# Patient Record
Sex: FEMALE | Race: ASIAN | NOT HISPANIC OR LATINO | URBAN - METROPOLITAN AREA
[De-identification: names, ages, dates, MRNs, and addresses within clinical notes are randomized per-mention and may not be internally consistent; named-entity substitution may affect disease eponyms.]

---

## 2018-03-06 ENCOUNTER — EMERGENCY (EMERGENCY)
Facility: HOSPITAL | Age: 33
LOS: 1 days | Discharge: HOME | End: 2018-03-06
Admitting: EMERGENCY MEDICINE
Payer: MEDICAID

## 2018-03-06 VITALS
HEART RATE: 95 BPM | SYSTOLIC BLOOD PRESSURE: 117 MMHG | RESPIRATION RATE: 16 BRPM | TEMPERATURE: 99 F | DIASTOLIC BLOOD PRESSURE: 81 MMHG | OXYGEN SATURATION: 99 %

## 2018-03-06 PROCEDURE — 12002 RPR S/N/AX/GEN/TRNK2.6-7.5CM: CPT

## 2018-03-06 PROCEDURE — 99283 EMERGENCY DEPT VISIT LOW MDM: CPT | Mod: 25

## 2018-03-06 RX ORDER — TETANUS TOXOID, REDUCED DIPHTHERIA TOXOID AND ACELLULAR PERTUSSIS VACCINE, ADSORBED 5; 2.5; 8; 8; 2.5 [IU]/.5ML; [IU]/.5ML; UG/.5ML; UG/.5ML; UG/.5ML
0.5 SUSPENSION INTRAMUSCULAR ONCE
Qty: 0 | Refills: 0 | Status: COMPLETED | OUTPATIENT
Start: 2018-03-06 | End: 2018-03-06

## 2018-03-06 RX ADMIN — TETANUS TOXOID, REDUCED DIPHTHERIA TOXOID AND ACELLULAR PERTUSSIS VACCINE, ADSORBED 0.5 MILLILITER(S): 5; 2.5; 8; 8; 2.5 SUSPENSION INTRAMUSCULAR at 21:43

## 2018-03-06 NOTE — ED PROVIDER NOTE - CARE PLAN
Principal Discharge DX:	Laceration of finger of left hand Principal Discharge DX:	Laceration of finger of left hand  Assessment and plan of treatment:	Have sutures removed in 7 days. Apply bacitracin twice per day. Keep area clean and dry (gentle washing with soap and water is ok). Rest, drink plenty of fluids.  Advance activity as tolerated.  Continue all previously prescribed medications as directed. You can use motrin 600mg every 6-8 hours for pain or fever, and/or Tylenol 650 mg every 4 hours for pain/fever. Follow up with your primary care physician in 48-72 hours- bring copies of your results.  Return to the emergency department for chest pain, shortness of breath, dizziness, or worsening, concerning, or persistent symptoms.

## 2018-03-06 NOTE — ED ADULT NURSE REASSESSMENT NOTE - NS ED NURSE REASSESS COMMENT FT1
Pt seen primarily by RENUKA Thorne, pt aox3, ambulatory, rcvd in surge area, here for a lac on L 2nd and 3rd fingers after trying to open a door, lac repair done, pt denies any other complaints, dc'ed in stable condition, steady gait, nad

## 2018-03-06 NOTE — ED PROVIDER NOTE - OBJECTIVE STATEMENT
33 yo F denies pmhx here for finger laceration x today. pt reports she was opening door and cut her L 2nd and 3rd fingers. bleeding controlled at home. denies other complaints. unknown last tetanus. denies fever chills vomiting numbness tingling. denies other injury.

## 2018-03-06 NOTE — ED PROVIDER NOTE - PHYSICAL EXAMINATION
L hand: 2nd and 3rd digit each with 1 cm linear superficial laceration, no active bleeding or discharge, FROM, no e/o ligament damage or injury. FROM of digits. NVI. sensate intact.

## 2018-03-06 NOTE — ED PROVIDER NOTE - PLAN OF CARE
Have sutures removed in 7 days. Apply bacitracin twice per day. Keep area clean and dry (gentle washing with soap and water is ok). Rest, drink plenty of fluids.  Advance activity as tolerated.  Continue all previously prescribed medications as directed. You can use motrin 600mg every 6-8 hours for pain or fever, and/or Tylenol 650 mg every 4 hours for pain/fever. Follow up with your primary care physician in 48-72 hours- bring copies of your results.  Return to the emergency department for chest pain, shortness of breath, dizziness, or worsening, concerning, or persistent symptoms.

## 2018-03-06 NOTE — ED ADULT TRIAGE NOTE - CHIEF COMPLAINT QUOTE
Pt complaining of L hand pointer and middle finger pain and laceration, cut her fingers on a door. Bleeding controlled at this time. pt denies any other medical complaints at this time.

## 2018-03-06 NOTE — ED PROVIDER NOTE - MEDICAL DECISION MAKING DETAILS
31 yo F denies pmhx here for L 2/3rd digit laceration from door. otherwise without complaints. requiring sutures. 33 yo F denies pmhx here for L 2/3rd digit laceration from door. otherwise without complaints. requiring sutures. adacel.

## 2018-03-06 NOTE — ED PROCEDURE NOTE - PROCEDURE ADDITIONAL DETAILS
2 separate 2 cm lacerations one on 2nd digit one on 3rd digit   no active bleeding  4 sutures placed in 2nd digit and 3 in third

## 2021-09-08 PROBLEM — Z00.00 ENCOUNTER FOR PREVENTIVE HEALTH EXAMINATION: Noted: 2021-09-08

## 2021-09-09 ENCOUNTER — APPOINTMENT (OUTPATIENT)
Dept: OBGYN | Facility: CLINIC | Age: 36
End: 2021-09-09

## 2021-09-17 ENCOUNTER — APPOINTMENT (OUTPATIENT)
Dept: OBGYN | Facility: CLINIC | Age: 36
End: 2021-09-17
Payer: MEDICAID

## 2021-09-17 VITALS — SYSTOLIC BLOOD PRESSURE: 111 MMHG | WEIGHT: 148 LBS | DIASTOLIC BLOOD PRESSURE: 76 MMHG

## 2021-09-17 DIAGNOSIS — Z34.90 ENCOUNTER FOR SUPERVISION OF NORMAL PREGNANCY, UNSPECIFIED, UNSPECIFIED TRIMESTER: ICD-10-CM

## 2021-09-17 PROCEDURE — 99204 OFFICE O/P NEW MOD 45 MIN: CPT

## 2021-09-17 PROCEDURE — 36415 COLL VENOUS BLD VENIPUNCTURE: CPT

## 2021-09-17 RX ORDER — LEVOTHYROXINE SODIUM 0.17 MG/1
TABLET ORAL
Refills: 0 | Status: ACTIVE | COMMUNITY

## 2021-09-18 ENCOUNTER — OUTPATIENT (OUTPATIENT)
Dept: INPATIENT UNIT | Facility: HOSPITAL | Age: 36
LOS: 1 days | Discharge: HOME | End: 2021-09-18

## 2021-09-18 VITALS
DIASTOLIC BLOOD PRESSURE: 72 MMHG | TEMPERATURE: 98 F | SYSTOLIC BLOOD PRESSURE: 110 MMHG | RESPIRATION RATE: 18 BRPM | HEART RATE: 102 BPM

## 2021-09-18 VITALS — HEART RATE: 97 BPM | SYSTOLIC BLOOD PRESSURE: 96 MMHG | DIASTOLIC BLOOD PRESSURE: 54 MMHG

## 2021-09-18 DIAGNOSIS — Z3A.00 WEEKS OF GESTATION OF PREGNANCY NOT SPECIFIED: ICD-10-CM

## 2021-09-18 DIAGNOSIS — O26.899 OTHER SPECIFIED PREGNANCY RELATED CONDITIONS, UNSPECIFIED TRIMESTER: ICD-10-CM

## 2021-09-18 LAB
APPEARANCE UR: CLEAR — SIGNIFICANT CHANGE UP
BACTERIA # UR AUTO: NEGATIVE — SIGNIFICANT CHANGE UP
BASOPHILS # BLD AUTO: 0.01 K/UL
BASOPHILS NFR BLD AUTO: 0.1 %
BILIRUB UR-MCNC: NEGATIVE — SIGNIFICANT CHANGE UP
COD CRY URNS QL: ABNORMAL
COLOR SPEC: YELLOW — SIGNIFICANT CHANGE UP
COMMENT - URINE: SIGNIFICANT CHANGE UP
DIFF PNL FLD: ABNORMAL
EOSINOPHIL # BLD AUTO: 0.06 K/UL
EOSINOPHIL NFR BLD AUTO: 0.7 %
EPI CELLS # UR: ABNORMAL
GLUCOSE 1H P 50 G GLC PO SERPL-MCNC: 119 MG/DL
GLUCOSE UR QL: NEGATIVE — SIGNIFICANT CHANGE UP
HCT VFR BLD CALC: 37.8 %
HGB BLD-MCNC: 12.1 G/DL
HYALINE CASTS # UR AUTO: 2 /LPF — SIGNIFICANT CHANGE UP (ref 0–7)
IMM GRANULOCYTES NFR BLD AUTO: 0.8 %
KETONES UR-MCNC: NEGATIVE — SIGNIFICANT CHANGE UP
LEUKOCYTE ESTERASE UR-ACNC: NEGATIVE — SIGNIFICANT CHANGE UP
LYMPHOCYTES # BLD AUTO: 1.46 K/UL
LYMPHOCYTES NFR BLD AUTO: 16.7 %
MAN DIFF?: NORMAL
MCHC RBC-ENTMCNC: 29.8 PG
MCHC RBC-ENTMCNC: 32 GM/DL
MCV RBC AUTO: 93.1 FL
MONOCYTES # BLD AUTO: 0.52 K/UL
MONOCYTES NFR BLD AUTO: 5.9 %
NEUTROPHILS # BLD AUTO: 6.63 K/UL
NEUTROPHILS NFR BLD AUTO: 75.8 %
NITRITE UR-MCNC: NEGATIVE — SIGNIFICANT CHANGE UP
PH UR: 6.5 — SIGNIFICANT CHANGE UP (ref 5–8)
PLATELET # BLD AUTO: 259 K/UL
PROT UR-MCNC: ABNORMAL
RBC # BLD: 4.06 M/UL
RBC # FLD: 15.3 %
RBC CASTS # UR COMP ASSIST: 1 /HPF — SIGNIFICANT CHANGE UP (ref 0–4)
SP GR SPEC: 1.02 — SIGNIFICANT CHANGE UP (ref 1–1.05)
T PALLIDUM AB SER QL IA: NEGATIVE
UROBILINOGEN FLD QL: SIGNIFICANT CHANGE UP
WBC # FLD AUTO: 8.75 K/UL
WBC UR QL: 1 /HPF — SIGNIFICANT CHANGE UP (ref 0–5)

## 2021-09-18 NOTE — OB PROVIDER TRIAGE NOTE - HISTORY OF PRESENT ILLNESS
35 yr old  @ 29-6 wks, presets with vaginal bleeding since 8 pm. States she went to void and saw and spot of blood on her panty liner, with more on the tissue after she wiped. Panty liner in triage had scan bright red blood. Denies VB/LOF/Ctx. Reports positive fetal movement.   PNI: Thyroid disorder, on synthroid 175 mcg  Obhx: , FT , 6 lbs           , FT , 6 lbs  Gynhx: denies  Surghx: denies  Medhx: thyroid disorder, on synthroid

## 2021-09-18 NOTE — OB PROVIDER TRIAGE NOTE - NSOBPROVIDERNOTE_OBGYN_ALL_OB_FT
35 yr ol d @ 29-6, 35 yr ol d @ 29-6,  No evidence of vaginal bleeding  Fetal status reassured  CAT 1 tracing  Discussed with Dr. Welch

## 2021-09-18 NOTE — OB PROVIDER TRIAGE NOTE - NSHPPHYSICALEXAM_GEN_ALL_CORE
Vital Signs Last 24 Hrs  T(C): 36.9 (18 Sep 2021 20:34), Max: 36.9 (18 Sep 2021 20:34)  T(F): 98.4 (18 Sep 2021 20:34), Max: 98.4 (18 Sep 2021 20:34)  HR: 102 (18 Sep 2021 20:38) (102 - 102)  BP: 110/72 (18 Sep 2021 20:38) (110/72 - 110/72)  BP(mean): --  RR: 18 (18 Sep 2021 20:34) (18 - 18)  SpO2: --    TOCO: ctx none  NST: , +10x10 accels, -decels, moderate variability  SSE: no pooling, no blood staining, cervix closed Vital Signs Last 24 Hrs  T(C): 36.9 (18 Sep 2021 20:34), Max: 36.9 (18 Sep 2021 20:34)  T(F): 98.4 (18 Sep 2021 20:34), Max: 98.4 (18 Sep 2021 20:34)  HR: 102 (18 Sep 2021 20:38) (102 - 102)  BP: 110/72 (18 Sep 2021 20:38) (110/72 - 110/72)  BP(mean): --  RR: 18 (18 Sep 2021 20:34) (18 - 18)  SpO2: --    TOCO: ctx none  NST: , +10x10 accels, -decels, moderate variability  SSE: no pooling, no blood staining, cervix closed  TVS: CL 4.7 cm, no dynamic changes, no funneling, no previa, posterior placenta  TAS: Vertex, posterior placenta, BPP 8/8, MVP 5 cm  AB positive

## 2021-09-20 PROBLEM — E03.9 HYPOTHYROIDISM, UNSPECIFIED: Chronic | Status: ACTIVE | Noted: 2021-09-18

## 2021-09-20 LAB — LEAD BLD-MCNC: <1 UG/DL

## 2021-09-21 LAB
M TB IFN-G BLD-IMP: NEGATIVE
QUANTIFERON TB PLUS MITOGEN MINUS NIL: 8.15 IU/ML
QUANTIFERON TB PLUS NIL: 0.01 IU/ML
QUANTIFERON TB PLUS TB1 MINUS NIL: 0.01 IU/ML
QUANTIFERON TB PLUS TB2 MINUS NIL: 0.03 IU/ML

## 2021-09-23 LAB
AR GENE MUT ANL BLD/T: NORMAL
FMR1 GENE MUT ANL BLD/T: NORMAL

## 2021-09-24 LAB — CFTR MUT TESTED BLD/T: NEGATIVE

## 2021-09-27 LAB
CLARI ADDITIONAL INFO: NORMAL
CLARI CHROMOSOME 13: NORMAL
CLARI CHROMOSOME 18: NORMAL
CLARI CHROMOSOME 21: NORMAL
CLARI SEX CHROMOSOMES: NORMAL
CLARITEST NIPT: NORMAL
MATERNAL WEIGHT (LBS):: 148
PLEASE INCLUDE GENDER RESULTS ON THIS REPORT:: NORMAL
TYPE OF PREGNANCY:: NORMAL

## 2021-10-01 ENCOUNTER — APPOINTMENT (OUTPATIENT)
Dept: OBGYN | Facility: CLINIC | Age: 36
End: 2021-10-01
Payer: MEDICAID

## 2021-10-01 DIAGNOSIS — O26.86 PRURITIC URTICARIAL PAPULES AND PLAQUES OF PREGNANCY (PUPPP): ICD-10-CM

## 2021-10-01 PROCEDURE — 0502F SUBSEQUENT PRENATAL CARE: CPT

## 2021-10-01 RX ORDER — MAGNESIUM HYDROXIDE 400 MG/5ML
27-0.8-25 SUSPENSION, ORAL (FINAL DOSE FORM) ORAL
Qty: 30 | Refills: 8 | Status: ACTIVE | COMMUNITY
Start: 2021-10-01 | End: 1900-01-01

## 2021-10-01 RX ORDER — CAMPHOR 0.45 %
25 GEL (GRAM) TOPICAL
Qty: 30 | Refills: 0 | Status: ACTIVE | COMMUNITY
Start: 2021-10-01 | End: 1900-01-01

## 2021-10-01 NOTE — PHYSICAL EXAM
[Appropriately responsive] : appropriately responsive [Alert] : alert [No Acute Distress] : no acute distress [No Lymphadenopathy] : no lymphadenopathy [Regular Rate Rhythm] : regular rate rhythm [No Murmurs] : no murmurs [Clear to Auscultation B/L] : clear to auscultation bilaterally [Soft] : soft [Non-tender] : non-tender [Non-distended] : non-distended [No HSM] : No HSM [No Lesions] : no lesions [No Mass] : no mass [Oriented x3] : oriented x3 [Examination Of The Breasts] : a normal appearance [No Masses] : no breast masses were palpable [Labia Majora] : normal [Labia Minora] : normal [Normal] : normal [Uterine Adnexae] : normal [FreeTextEntry7] : gravid

## 2021-10-01 NOTE — HISTORY OF PRESENT ILLNESS
[Patient reported PAP Smear was normal] : Patient reported PAP Smear was normal [FreeTextEntry1] : Patient is a 34 y/o  at 29w4d LMP 2/15/21, MARLEY 21 by first trimester sonogram. [place] to [place] care. PNC uncomplicated per patient but has not had glucose screen or genetic testing.\par \par OBHx:\par 2009: , male, 6#\par : , female, 6# [PapSmeardate] : 2020

## 2021-10-12 ENCOUNTER — NON-APPOINTMENT (OUTPATIENT)
Age: 36
End: 2021-10-12

## 2021-10-15 ENCOUNTER — APPOINTMENT (OUTPATIENT)
Dept: OBGYN | Facility: CLINIC | Age: 36
End: 2021-10-15
Payer: MEDICAID

## 2021-10-15 VITALS — SYSTOLIC BLOOD PRESSURE: 116 MMHG | DIASTOLIC BLOOD PRESSURE: 77 MMHG | WEIGHT: 153 LBS

## 2021-10-15 PROCEDURE — 0502F SUBSEQUENT PRENATAL CARE: CPT

## 2021-10-17 LAB — TSH SERPL-ACNC: 1.67 UIU/ML

## 2021-10-26 ENCOUNTER — NON-APPOINTMENT (OUTPATIENT)
Age: 36
End: 2021-10-26

## 2021-10-27 ENCOUNTER — APPOINTMENT (OUTPATIENT)
Dept: OBGYN | Facility: CLINIC | Age: 36
End: 2021-10-27

## 2021-11-02 ENCOUNTER — NON-APPOINTMENT (OUTPATIENT)
Age: 36
End: 2021-11-02

## 2021-11-04 ENCOUNTER — TRANSCRIPTION ENCOUNTER (OUTPATIENT)
Age: 36
End: 2021-11-04

## 2021-11-04 ENCOUNTER — APPOINTMENT (OUTPATIENT)
Dept: OBGYN | Facility: CLINIC | Age: 36
End: 2021-11-04
Payer: MEDICAID

## 2021-11-04 VITALS
DIASTOLIC BLOOD PRESSURE: 81 MMHG | WEIGHT: 156 LBS | SYSTOLIC BLOOD PRESSURE: 119 MMHG | BODY MASS INDEX: 29.45 KG/M2 | HEIGHT: 61 IN

## 2021-11-04 DIAGNOSIS — E03.9 HYPOTHYROIDISM, UNSPECIFIED: ICD-10-CM

## 2021-11-04 PROCEDURE — 0502F SUBSEQUENT PRENATAL CARE: CPT

## 2021-11-04 RX ORDER — LEVOTHYROXINE SODIUM 0.17 MG/1
175 TABLET ORAL DAILY
Qty: 90 | Refills: 1 | Status: ACTIVE | COMMUNITY
Start: 2021-11-04 | End: 1900-01-01

## 2021-11-05 LAB — HIV1+2 AB SPEC QL IA.RAPID: NONREACTIVE

## 2021-11-06 LAB
GP B STREP DNA SPEC QL NAA+PROBE: NORMAL
GP B STREP DNA SPEC QL NAA+PROBE: NOT DETECTED
SOURCE GBS: NORMAL

## 2021-11-09 ENCOUNTER — NON-APPOINTMENT (OUTPATIENT)
Age: 36
End: 2021-11-09

## 2021-11-11 ENCOUNTER — APPOINTMENT (OUTPATIENT)
Dept: OBGYN | Facility: CLINIC | Age: 36
End: 2021-11-11
Payer: MEDICAID

## 2021-11-11 VITALS — BODY MASS INDEX: 29.85 KG/M2 | SYSTOLIC BLOOD PRESSURE: 120 MMHG | WEIGHT: 158 LBS | DIASTOLIC BLOOD PRESSURE: 77 MMHG

## 2021-11-11 PROCEDURE — 0502F SUBSEQUENT PRENATAL CARE: CPT

## 2021-11-18 ENCOUNTER — APPOINTMENT (OUTPATIENT)
Dept: ANTEPARTUM | Facility: CLINIC | Age: 36
End: 2021-11-18
Payer: MEDICAID

## 2021-11-18 ENCOUNTER — APPOINTMENT (OUTPATIENT)
Dept: OBGYN | Facility: CLINIC | Age: 36
End: 2021-11-18
Payer: MEDICAID

## 2021-11-18 VITALS — SYSTOLIC BLOOD PRESSURE: 123 MMHG | BODY MASS INDEX: 30.23 KG/M2 | DIASTOLIC BLOOD PRESSURE: 82 MMHG | WEIGHT: 160 LBS

## 2021-11-18 PROCEDURE — 76819 FETAL BIOPHYS PROFIL W/O NST: CPT

## 2021-11-18 PROCEDURE — 76816 OB US FOLLOW-UP PER FETUS: CPT

## 2021-11-18 PROCEDURE — 0502F SUBSEQUENT PRENATAL CARE: CPT

## 2021-11-19 ENCOUNTER — NON-APPOINTMENT (OUTPATIENT)
Age: 36
End: 2021-11-19

## 2021-11-20 ENCOUNTER — APPOINTMENT (OUTPATIENT)
Dept: OBGYN | Facility: CLINIC | Age: 36
End: 2021-11-20
Payer: MEDICAID

## 2021-11-20 DIAGNOSIS — Z00.00 ENCOUNTER FOR GENERAL ADULT MEDICAL EXAMINATION W/OUT ABNORMAL FINDINGS: ICD-10-CM

## 2021-11-20 PROCEDURE — ZZZZZ: CPT

## 2021-11-22 ENCOUNTER — NON-APPOINTMENT (OUTPATIENT)
Age: 36
End: 2021-11-22

## 2021-11-22 LAB — SARS-COV-2 N GENE NPH QL NAA+PROBE: NOT DETECTED

## 2021-11-23 ENCOUNTER — INPATIENT (INPATIENT)
Facility: HOSPITAL | Age: 36
LOS: 2 days | Discharge: HOME | End: 2021-11-26
Attending: OBSTETRICS & GYNECOLOGY | Admitting: OBSTETRICS & GYNECOLOGY
Payer: MEDICAID

## 2021-11-23 ENCOUNTER — TRANSCRIPTION ENCOUNTER (OUTPATIENT)
Age: 36
End: 2021-11-23

## 2021-11-23 VITALS — TEMPERATURE: 99 F

## 2021-11-23 LAB
BASOPHILS # BLD AUTO: 0.02 K/UL — SIGNIFICANT CHANGE UP (ref 0–0.2)
BASOPHILS NFR BLD AUTO: 0.3 % — SIGNIFICANT CHANGE UP (ref 0–2)
COVID-19 SPIKE DOMAIN AB INTERP: POSITIVE
COVID-19 SPIKE DOMAIN ANTIBODY RESULT: >250 U/ML — HIGH
EOSINOPHIL # BLD AUTO: 0.09 K/UL — SIGNIFICANT CHANGE UP (ref 0–0.5)
EOSINOPHIL NFR BLD AUTO: 1.3 % — SIGNIFICANT CHANGE UP (ref 0–6)
HCT VFR BLD CALC: 37.9 % — SIGNIFICANT CHANGE UP (ref 34.5–45)
HGB BLD-MCNC: 13.1 G/DL — SIGNIFICANT CHANGE UP (ref 11.5–15.5)
IANC: 4.76 K/UL — SIGNIFICANT CHANGE UP (ref 1.5–8.5)
IMM GRANULOCYTES NFR BLD AUTO: 0.6 % — SIGNIFICANT CHANGE UP (ref 0–1.5)
LYMPHOCYTES # BLD AUTO: 1.68 K/UL — SIGNIFICANT CHANGE UP (ref 1–3.3)
LYMPHOCYTES # BLD AUTO: 23.5 % — SIGNIFICANT CHANGE UP (ref 13–44)
MCHC RBC-ENTMCNC: 31.3 PG — SIGNIFICANT CHANGE UP (ref 27–34)
MCHC RBC-ENTMCNC: 34.6 GM/DL — SIGNIFICANT CHANGE UP (ref 32–36)
MCV RBC AUTO: 90.5 FL — SIGNIFICANT CHANGE UP (ref 80–100)
MONOCYTES # BLD AUTO: 0.56 K/UL — SIGNIFICANT CHANGE UP (ref 0–0.9)
MONOCYTES NFR BLD AUTO: 7.8 % — SIGNIFICANT CHANGE UP (ref 2–14)
NEUTROPHILS # BLD AUTO: 4.76 K/UL — SIGNIFICANT CHANGE UP (ref 1.8–7.4)
NEUTROPHILS NFR BLD AUTO: 66.5 % — SIGNIFICANT CHANGE UP (ref 43–77)
NRBC # BLD: 0 /100 WBCS — SIGNIFICANT CHANGE UP
NRBC # FLD: 0 K/UL — SIGNIFICANT CHANGE UP
PLATELET # BLD AUTO: 190 K/UL — SIGNIFICANT CHANGE UP (ref 150–400)
RBC # BLD: 4.19 M/UL — SIGNIFICANT CHANGE UP (ref 3.8–5.2)
RBC # FLD: 15 % — HIGH (ref 10.3–14.5)
SARS-COV-2 IGG+IGM SERPL QL IA: >250 U/ML — HIGH
SARS-COV-2 IGG+IGM SERPL QL IA: POSITIVE
T PALLIDUM AB TITR SER: NEGATIVE — SIGNIFICANT CHANGE UP
WBC # BLD: 7.15 K/UL — SIGNIFICANT CHANGE UP (ref 3.8–10.5)
WBC # FLD AUTO: 7.15 K/UL — SIGNIFICANT CHANGE UP (ref 3.8–10.5)

## 2021-11-23 RX ORDER — OXYTOCIN 10 UNIT/ML
333.33 VIAL (ML) INJECTION
Qty: 20 | Refills: 0 | Status: DISCONTINUED | OUTPATIENT
Start: 2021-11-23 | End: 2021-11-25

## 2021-11-23 RX ORDER — SODIUM CHLORIDE 9 MG/ML
1000 INJECTION, SOLUTION INTRAVENOUS
Refills: 0 | Status: DISCONTINUED | OUTPATIENT
Start: 2021-11-23 | End: 2021-11-24

## 2021-11-23 RX ORDER — INFLUENZA VIRUS VACCINE 15; 15; 15; 15 UG/.5ML; UG/.5ML; UG/.5ML; UG/.5ML
0.5 SUSPENSION INTRAMUSCULAR ONCE
Refills: 0 | Status: DISCONTINUED | OUTPATIENT
Start: 2021-11-23 | End: 2021-11-26

## 2021-11-23 RX ORDER — LEVOTHYROXINE SODIUM 125 MCG
175 TABLET ORAL DAILY
Refills: 0 | Status: DISCONTINUED | OUTPATIENT
Start: 2021-11-23 | End: 2021-11-24

## 2021-11-23 RX ORDER — OXYTOCIN 10 UNIT/ML
VIAL (ML) INJECTION
Qty: 30 | Refills: 0 | Status: DISCONTINUED | OUTPATIENT
Start: 2021-11-23 | End: 2021-11-24

## 2021-11-23 RX ORDER — CITRIC ACID/SODIUM CITRATE 300-500 MG
15 SOLUTION, ORAL ORAL EVERY 6 HOURS
Refills: 0 | Status: DISCONTINUED | OUTPATIENT
Start: 2021-11-23 | End: 2021-11-24

## 2021-11-23 RX ADMIN — Medication 175 MICROGRAM(S): at 06:54

## 2021-11-23 RX ADMIN — Medication 2 MILLIUNIT(S)/MIN: at 20:14

## 2021-11-23 NOTE — OB PROVIDER H&P - PRO INTERPRETER NEED 2
From: John Tam  To: Pierre Diaz  Sent: 11/13/2020 5:15 AM CST  Subject: Medication Question    I am writing this message at 5 am. continuing with my BM problems. I had 4 BM from midnight to 4:30 am Didn't make it to the bathroom in time once. A little pain in stomach last 2 BM. The BM changed color from brown to light tan and watery. I am going to  the 3 suggested over the counter meds this AM. Being the weekend how long do I use the 3 meds until I know there is something else wrong. Thank you   English

## 2021-11-23 NOTE — CHART NOTE - NSCHARTNOTEFT_GEN_A_CORE
OB Attending Progress Note    Patient seen and evaluated at bedside.  Denies complaints.  Comfortable w/ epidural.      T(C): 37.0 (11-23-21 @ 19:09), Max: 37.1 (11-23-21 @ 02:29)  HR: 85 (11-23-21 @ 22:57) (68 - 110)  BP: 123/67 (11-23-21 @ 22:42) (105/66 - 137/91)  RR: 16 (11-23-21 @ 02:59) (16 - 16)  SpO2: 99% (11-23-21 @ 22:50) (97% - 100%)    SVE: prior exam unchanged, head high, unable to AROM - 8:30pm. Exam deferred at present    EFM: 145, mod nikki, +acels, intermittent late decels  [place]:  ctx q 2-3 mins    A/P 36y P2 admitted for risk reducing IOL      -Labor: s/p po cyto and cervical balloon, pitocin currently at 6mu/min. Intrauterine resuscitation with lateral positioning and O2  -Fetal Status: reassuring  -GBS: negative  -Analgesia: epidural in place    SHARITA Lee MD

## 2021-11-23 NOTE — OB PROVIDER LABOR PROGRESS NOTE - ASSESSMENT
A/P:   - Labor: rrIOL on PO Cytotec. CB placed.  - Fetus: Cat 1  - GBS: neg    Yessi Riojas, PGY-2  d/w Dr Haney   
A/P:   - Labor: rrIOL. Sp PO. Pt to get last dose PO then switch to Pitocin.  - Fetus: cat 1  - GBS: neg  - Pain: Epi pending     Yessi Riojas, PGY-2  d/w Dr Welch

## 2021-11-23 NOTE — OB RN PATIENT PROFILE - COMFORT/ACCEPTABLE PAIN LEVEL (0-10)
Patient's son, Delano, called for update. Called back and update was provided. All questions and concerns were addressed. Patient's son, Jasbir, also called. Son, Delano, stated he'd update so no need to call.   5

## 2021-11-23 NOTE — OB PROVIDER LABOR PROGRESS NOTE - NS_SUBJECTIVE/OBJECTIVE_OBGYN_ALL_OB_FT
R2 OB Labor Note    S: Patient seen and examined at bedside.     T(C): 36.5 (11-23-21 @ 14:56), Max: 37.1 (11-23-21 @ 02:29)  HR: 68 (11-23-21 @ 17:55) (68 - 94)  BP: 137/91 (11-23-21 @ 17:32) (127/83 - 137/91)  BP(mean): --  ABP: --  ABP(mean): --  RR: 16 (11-23-21 @ 02:59) (16 - 16)  SpO2: --  Wt(kg): --  CVP(mm Hg): --  CI: --  CAPILLARY BLOOD GLUCOSE       N/A      11-22 @ 07:01  -  11-23 @ 07:00  --------------------------------------------------------  IN:    Lactated Ringers: 500 mL  Total IN: 500 mL    OUT:  Total OUT: 0 mL    Total NET: 500 mL
R2 OB Labor Note    S: Patient seen and examined at bedside.     T(C): 36.5 (11-23-21 @ 14:56), Max: 37.1 (11-23-21 @ 02:29)  HR: 84 (11-23-21 @ 14:55) (81 - 94)  BP: 135/68 (11-23-21 @ 14:55) (127/83 - 135/68)  BP(mean): --  ABP: --  ABP(mean): --  RR: 16 (11-23-21 @ 02:59) (16 - 16)  SpO2: --  Wt(kg): --  CVP(mm Hg): --  CI: --  CAPILLARY BLOOD GLUCOSE       N/A      11-22 @ 07:01  -  11-23 @ 07:00  --------------------------------------------------------  IN:    Lactated Ringers: 500 mL  Total IN: 500 mL    OUT:  Total OUT: 0 mL    Total NET: 500 mL

## 2021-11-23 NOTE — OB PROVIDER H&P - NSHPPHYSICALEXAM_GEN_ALL_CORE
VS:  Vital Signs Last 24 Hrs  T(C): 37.1 (23 Nov 2021 02:59), Max: 37.1 (23 Nov 2021 02:29)  T(F): 98.8 (23 Nov 2021 02:59), Max: 98.8 (23 Nov 2021 02:59)  HR: 90 (23 Nov 2021 02:59) (90 - 90)  BP: 127/83 (23 Nov 2021 02:59) (127/83 - 127/83)  BP(mean): --  RR: 16 (23 Nov 2021 02:59) (16 - 16)  SpO2: --  GA: comfortable, NAD  Cards: RRR  Pulm: CTAB  EFH: Baseline 145, moderate variability, accelerations present, no decels  [place]: Quiet  VE: 0/0/-3  TAUS: vertex presentation

## 2021-11-23 NOTE — OB PROVIDER H&P - HISTORY OF PRESENT ILLNESS
R2 H&P    Pt is a 37 y/o  at 39w2d admitted for scheduled induction of labor  Patient denies contractions, vaginal bleeding, LOF, FM felt.   GBS negative  EFW 3300g    OBHx:  -   ->6lb3oz, patient reports either vacuum or forceps was used, unsure of which one  -   -> 6lb2oz  - eTOP x 1 D&C  GynHx: denies any fibroids, cysts, abnl pap smears, STIs  PMHx: hypothyroidism  PSHx: D&C  Med: levothyroxine 175 mcg, B12, PNV  All: denies  SH: denies alcohol, tobacco, or illicit substance use   Psych: denies anxiety or depression

## 2021-11-23 NOTE — OB PROVIDER H&P - ASSESSMENT
A&P:   Labor: admit for scheduled induction of labor  - IOL with po cytotec  -routine labs  -EFM/toco  -CLD, IV hydration  -Pepcid/reglan/bicitra  Fetal: cat 1 tracing, fetal status reassuring  GBS: neg      Discussed with Dr. Deanna Cabral PGY-2

## 2021-11-24 ENCOUNTER — TRANSCRIPTION ENCOUNTER (OUTPATIENT)
Age: 36
End: 2021-11-24

## 2021-11-24 LAB — SARS-COV-2 RNA SPEC QL NAA+PROBE: SIGNIFICANT CHANGE UP

## 2021-11-24 PROCEDURE — 59514 CESAREAN DELIVERY ONLY: CPT | Mod: AS,U9

## 2021-11-24 PROCEDURE — 59510 CESAREAN DELIVERY: CPT | Mod: U9

## 2021-11-24 RX ORDER — TETANUS TOXOID, REDUCED DIPHTHERIA TOXOID AND ACELLULAR PERTUSSIS VACCINE, ADSORBED 5; 2.5; 8; 8; 2.5 [IU]/.5ML; [IU]/.5ML; UG/.5ML; UG/.5ML; UG/.5ML
0.5 SUSPENSION INTRAMUSCULAR ONCE
Refills: 0 | Status: DISCONTINUED | OUTPATIENT
Start: 2021-11-24 | End: 2021-11-26

## 2021-11-24 RX ORDER — GUAIFENESIN/DEXTROMETHORPHAN 600MG-30MG
20 TABLET, EXTENDED RELEASE 12 HR ORAL EVERY 4 HOURS
Refills: 0 | Status: DISCONTINUED | OUTPATIENT
Start: 2021-11-24 | End: 2021-11-24

## 2021-11-24 RX ORDER — ACETAMINOPHEN 500 MG
975 TABLET ORAL
Refills: 0 | Status: DISCONTINUED | OUTPATIENT
Start: 2021-11-24 | End: 2021-11-26

## 2021-11-24 RX ORDER — DIPHENHYDRAMINE HCL 50 MG
25 CAPSULE ORAL EVERY 6 HOURS
Refills: 0 | Status: DISCONTINUED | OUTPATIENT
Start: 2021-11-24 | End: 2021-11-26

## 2021-11-24 RX ORDER — KETOROLAC TROMETHAMINE 30 MG/ML
30 SYRINGE (ML) INJECTION EVERY 6 HOURS
Refills: 0 | Status: DISCONTINUED | OUTPATIENT
Start: 2021-11-24 | End: 2021-11-25

## 2021-11-24 RX ORDER — SODIUM CHLORIDE 9 MG/ML
1000 INJECTION, SOLUTION INTRAVENOUS
Refills: 0 | Status: DISCONTINUED | OUTPATIENT
Start: 2021-11-24 | End: 2021-11-25

## 2021-11-24 RX ORDER — GUAIFENESIN/DEXTROMETHORPHAN 600MG-30MG
10 TABLET, EXTENDED RELEASE 12 HR ORAL EVERY 4 HOURS
Refills: 0 | Status: DISCONTINUED | OUTPATIENT
Start: 2021-11-24 | End: 2021-11-26

## 2021-11-24 RX ORDER — HEPARIN SODIUM 5000 [USP'U]/ML
5000 INJECTION INTRAVENOUS; SUBCUTANEOUS EVERY 12 HOURS
Refills: 0 | Status: DISCONTINUED | OUTPATIENT
Start: 2021-11-24 | End: 2021-11-26

## 2021-11-24 RX ORDER — IBUPROFEN 200 MG
600 TABLET ORAL EVERY 6 HOURS
Refills: 0 | Status: COMPLETED | OUTPATIENT
Start: 2021-11-24 | End: 2022-10-23

## 2021-11-24 RX ORDER — MAGNESIUM HYDROXIDE 400 MG/1
30 TABLET, CHEWABLE ORAL
Refills: 0 | Status: DISCONTINUED | OUTPATIENT
Start: 2021-11-24 | End: 2021-11-26

## 2021-11-24 RX ORDER — LANOLIN
1 OINTMENT (GRAM) TOPICAL EVERY 6 HOURS
Refills: 0 | Status: DISCONTINUED | OUTPATIENT
Start: 2021-11-24 | End: 2021-11-26

## 2021-11-24 RX ORDER — OXYCODONE HYDROCHLORIDE 5 MG/1
5 TABLET ORAL
Refills: 0 | Status: DISCONTINUED | OUTPATIENT
Start: 2021-11-24 | End: 2021-11-26

## 2021-11-24 RX ORDER — OXYCODONE HYDROCHLORIDE 5 MG/1
5 TABLET ORAL ONCE
Refills: 0 | Status: DISCONTINUED | OUTPATIENT
Start: 2021-11-24 | End: 2021-11-26

## 2021-11-24 RX ORDER — ACETAMINOPHEN 500 MG
1000 TABLET ORAL ONCE
Refills: 0 | Status: COMPLETED | OUTPATIENT
Start: 2021-11-24 | End: 2021-11-24

## 2021-11-24 RX ORDER — IBUPROFEN 200 MG
600 TABLET ORAL EVERY 6 HOURS
Refills: 0 | Status: DISCONTINUED | OUTPATIENT
Start: 2021-11-24 | End: 2021-11-26

## 2021-11-24 RX ORDER — OXYTOCIN 10 UNIT/ML
333.33 VIAL (ML) INJECTION
Qty: 20 | Refills: 0 | Status: DISCONTINUED | OUTPATIENT
Start: 2021-11-24 | End: 2021-11-25

## 2021-11-24 RX ORDER — OXYTOCIN 10 UNIT/ML
20 VIAL (ML) INJECTION
Qty: 30 | Refills: 0 | Status: DISCONTINUED | OUTPATIENT
Start: 2021-11-24 | End: 2021-11-24

## 2021-11-24 RX ORDER — SIMETHICONE 80 MG/1
80 TABLET, CHEWABLE ORAL EVERY 4 HOURS
Refills: 0 | Status: DISCONTINUED | OUTPATIENT
Start: 2021-11-24 | End: 2021-11-26

## 2021-11-24 RX ADMIN — Medication 600 MILLIGRAM(S): at 23:35

## 2021-11-24 RX ADMIN — Medication 0.25 MILLIGRAM(S): at 06:47

## 2021-11-24 RX ADMIN — Medication 0.25 MILLIGRAM(S): at 02:11

## 2021-11-24 RX ADMIN — Medication 30 MILLIGRAM(S): at 17:00

## 2021-11-24 RX ADMIN — Medication 30 MILLIGRAM(S): at 10:29

## 2021-11-24 RX ADMIN — Medication 400 MILLIGRAM(S): at 12:15

## 2021-11-24 RX ADMIN — Medication 1000 MILLIUNIT(S)/MIN: at 09:07

## 2021-11-24 RX ADMIN — Medication 30 MILLIGRAM(S): at 17:33

## 2021-11-24 RX ADMIN — HEPARIN SODIUM 5000 UNIT(S): 5000 INJECTION INTRAVENOUS; SUBCUTANEOUS at 17:00

## 2021-11-24 RX ADMIN — SODIUM CHLORIDE 75 MILLILITER(S): 9 INJECTION, SOLUTION INTRAVENOUS at 09:10

## 2021-11-24 RX ADMIN — Medication 30 MILLIGRAM(S): at 11:00

## 2021-11-24 NOTE — BRIEF OPERATIVE NOTE - NSICDXBRIEFPOSTOP_GEN_ALL_CORE_FT
POST-OP DIAGNOSIS:  Delivery by  section using transverse incision of lower segment of uterus 2021 09:31:10  Rachel Silva

## 2021-11-24 NOTE — OB PROVIDER DELIVERY SUMMARY - NSSELHIDDEN_OBGYN_ALL_OB_FT
[NS_DeliveryAttending1_OBGYN_ALL_OB_FT:[id number]==],[NS_DeliveryRN_OBGYN_ALL_OB_FT:[id number]==],[NS_DeliveryAssist1_OBGYN_ALL_OB_FT:[id number]==]

## 2021-11-24 NOTE — CHART NOTE - NSCHARTNOTEFT_GEN_A_CORE
OB Attending Progress Note    Patient seen and evaluated at bedside for prolonged decel.  Denies complaints.  Comfortable w/ epidural.      T(C): 37.2 (11-24-21 @ 01:23), Max: 37.2 (11-24-21 @ 01:23)  HR: 133 (11-24-21 @ 02:30) (68 - 133)  BP: 117/- (11-24-21 @ 02:28) (105/66 - 137/91)  RR: 14 (11-23-21 @ 23:02) (14 - 16)  SpO2: 99% (11-24-21 @ 02:00) (97% - 100%)    SVE: unchanged, IUPC placed    EFM: 150, mod nikki, no accels, prolonged decel x4 mins  [place]:  ctx irregular q 2-4 mins    A/P 36y P2  admitted for risk reducing IOL      -Labor: terbutaline given x1. Contractions inadequate on IUPC. Will continue intrauterine resuscitation. Will start 1x1 pitocin once tracing improves  -Fetal Status: overall reassuring  -GBS: negative  -Analgesia: epidural in place    SHARITA Lee MD

## 2021-11-24 NOTE — OB RN INTRAOPERATIVE NOTE - NSSELHIDDEN_OBGYN_ALL_OB_FT
[NS_DeliveryAttending1_OBGYN_ALL_OB_FT:[id number]==],[NS_DeliveryRN_OBGYN_ALL_OB_FT:[id number]==]

## 2021-11-24 NOTE — OB RN DELIVERY SUMMARY - NS_CULTURES_OBGYN_ALL_OB
[place] ED to IP Report (EDIP)    Mental Status     Alert and oriented    Safety     None    Mobility    Independent    Protocols  None    ISAR          Isolation  None    Additional Information:   No

## 2021-11-24 NOTE — DISCHARGE NOTE OB - CARE PROVIDER_API CALL
Emily Love; MPH)  Bhavna LI  [address], [address]  [place] [address]  Phone: [phone]  Fax: [phone]  Follow Up Time:

## 2021-11-24 NOTE — CHART NOTE - NSCHARTNOTEFT_GEN_A_CORE
OB Attending    At patient's bedside reviewing tracing and course of induction  Exam remains unchanged  Amnioinfusion continues  Variable decels with 50% of contractions    Plan of care including primary C/S discussed with patient. Patient declining at this time, would like to continue IOL  Explained that we are unable to start pitocin given FHT  Explained to patient that C/S indicated in event of terminal bradycardia and signs of persistent fetal distress    At present patient currently desirae on her own, +accel with scalp stim    Plan  -Will continue to closely monitor tracing  -Continue intrauterine resuscitation    N Rebekah-MD Julio Cesar OB Attending    At patient's bedside reviewing tracing and course of induction  Exam remains unchanged  Amnioinfusion continues  Recurrent variable decels of contractions    Plan of care including primary C/S discussed with patient. Patient declining at this time  Explained that we are unable to start pitocin given FHT  Explained to patient that C/S indicated in event of terminal bradycardia and signs of persistent fetal distress    At present patient currently desirae on her own, +accel with scalp stim    Plan  -terbutaline given   -Continue intrauterine resuscitation  -Will continue to closely monitor tracing    N Sample-MD Julio Cesar

## 2021-11-24 NOTE — OB RN DELIVERY SUMMARY - NSSELHIDDEN_OBGYN_ALL_OB_FT
[NS_DeliveryAttending1_OBGYN_ALL_OB_FT:[id number]==],[NS_DeliveryRN_OBGYN_ALL_OB_FT:[id number]==] [NS_DeliveryAttending1_OBGYN_ALL_OB_FT:[id number]==],[NS_DeliveryRN_OBGYN_ALL_OB_FT:[id number]==],[NS_DeliveryAssist1_OBGYN_ALL_OB_FT:[id number]==]

## 2021-11-24 NOTE — DISCHARGE NOTE OB - MEDICATION SUMMARY - MEDICATIONS TO TAKE
I will START or STAY ON the medications listed below when I get home from the hospital:    ibuprofen 600 mg oral tablet  -- 1 tab(s) by mouth every 6 hours  -- Indication: For  delivery delivered    oxyCODONE 5 mg oral tablet  -- 1 tab(s) by mouth every 4 to 6 hours, As Needed -Moderate to Severe Pain (4-10) MDD:6 tabs  -- Indication: For  delivery delivered

## 2021-11-24 NOTE — BRIEF OPERATIVE NOTE - OPERATION/FINDINGS
Viable male infant, apgar 8/9, weight [id number]  delivered @07:49  cephalic presentation; clear copious fluid noted  nuchal x 1, cord around wrist X 1  hysterotomy closed in single layer; left extension was repaired  Surgicel powder placed over hysterotomy, rectus layer reapproximated; fascia layer reapproximated   otherwise grossly normal uterus, tubes & ovaries    QBL: 1058 cc  UOP: 170 cc  IVF: 1500 cc  Dictation Number: [id number]

## 2021-11-24 NOTE — OB RN DELIVERY SUMMARY - NS_LABORCHARACTER_OBGYN_ALL_OB
Induction of labor-AROM/Induction of labor-Medicinal/Augmentation of labor/External electronic FM/Fetal intolerance

## 2021-11-24 NOTE — DISCHARGE NOTE OB - PATIENT PORTAL LINK FT
You can access the FollowMyHealth Patient Portal offered by [facility] by registering at the following website: [email or web address]. By joining [employer]’s FollowMyHealth portal, you will also be able to view your health information using other applications (apps) compatible with our system.

## 2021-11-24 NOTE — OB RN DELIVERY SUMMARY - NS_SEPSISRSKCALC_OBGYN_ALL_OB_FT
EOS calculated successfully. EOS Risk Factor: 0.5/1000 live births ([facility] national incidence); GA=39w3d; Temp=98.96; ROM=6.567; GBS='Negative'; Antibiotics='No antibiotics or any antibiotics < 2 hrs prior to birth'

## 2021-11-24 NOTE — OB PROVIDER DELIVERY SUMMARY - NSPROVIDERDELIVERYNOTE_OBGYN_ALL_OB_FT
Viable male infant, apgar 8/9, weight [id number]  delivered @07:49  cephalic presentation; clear copious fluid noted  nuchal x 1, cord around wrist X 1  hysterotomy closed in single layer; left extension was repaired  Surgicel powder placed over hysterotomy, rectus layer reapproximated; fascia layer reapproximated   otherwise grossly normal uterus, tubes & ovaries    QBL: 1058 cc  UOP: 170 cc  IVF: 1500 cc  Dictation Number: [id number] Viable male infant, apgar 8/9, weight [id number]  delivered @07:49  cephalic presentation; meconium stained fluid noted  nuchal x 1, cord around wrist X 1  hysterotomy closed in single layer; left extension was repaired  Surgicel powder placed over hysterotomy, rectus layer reapproximated; fascia layer reapproximated   otherwise grossly normal uterus, tubes & ovaries    QBL: 1058 cc  UOP: 170 cc  IVF: 1500 cc  Dictation Number: [id number]

## 2021-11-24 NOTE — DISCHARGE NOTE OB - CARE PLAN
Principal Discharge DX:	 delivery delivered  Assessment and plan of treatment:	No heavy lifting  Nothing per vagina   1

## 2021-11-24 NOTE — OB NEONATOLOGY/PEDIATRICIAN DELIVERY SUMMARY - NSPEDSNEONOTESA_OBGYN_ALL_OB_FT
Pediatrics called for category II FH tracing and primary c/s. This is a 39+3 wk male born via c/s to a 35 y/o  mother. Maternal pmh significant for hypothyroidism (on synthroid) and no significant prenatal history. Maternal labs include Blood Type AB+, HIV -  , RPR NR, Rubella I, Hep B -, GBS - from . Mother COVID negative. AROM with blood in fluids approximately 6/5 hours PTD. Baby emerged vigorous, crying, was w/d/s/s with APGARS of 8/9. At approximately 6 minutes of life, baby was showing signs of increased work of breathing. CPAP initiated with max setting of 5/60%; able to be weaned to room air and off CPAP by 15 minutes of life.  Mom plans to initiate breastfeeding, consents to Hep B vaccine, declines circ.  No maternal fevers

## 2021-11-24 NOTE — CHART NOTE - NSCHARTNOTEFT_GEN_A_CORE
OB Attending    Plan of care discussed with patient and   R/B/A discussed  Decision made to proceed with primary C/S    N Rebekah-MD Julio Cesar

## 2021-11-25 LAB
BASOPHILS # BLD AUTO: 0.02 K/UL — SIGNIFICANT CHANGE UP (ref 0–0.2)
BASOPHILS NFR BLD AUTO: 0.1 % — SIGNIFICANT CHANGE UP (ref 0–2)
EOSINOPHIL # BLD AUTO: 0.05 K/UL — SIGNIFICANT CHANGE UP (ref 0–0.5)
EOSINOPHIL NFR BLD AUTO: 0.3 % — SIGNIFICANT CHANGE UP (ref 0–6)
HCT VFR BLD CALC: 29 % — LOW (ref 34.5–45)
HGB BLD-MCNC: 9.6 G/DL — LOW (ref 11.5–15.5)
IANC: 11.92 K/UL — HIGH (ref 1.5–8.5)
IMM GRANULOCYTES NFR BLD AUTO: 1 % — SIGNIFICANT CHANGE UP (ref 0–1.5)
LYMPHOCYTES # BLD AUTO: 1.87 K/UL — SIGNIFICANT CHANGE UP (ref 1–3.3)
LYMPHOCYTES # BLD AUTO: 12.7 % — LOW (ref 13–44)
MCHC RBC-ENTMCNC: 31.6 PG — SIGNIFICANT CHANGE UP (ref 27–34)
MCHC RBC-ENTMCNC: 33.1 GM/DL — SIGNIFICANT CHANGE UP (ref 32–36)
MCV RBC AUTO: 95.4 FL — SIGNIFICANT CHANGE UP (ref 80–100)
MONOCYTES # BLD AUTO: 0.73 K/UL — SIGNIFICANT CHANGE UP (ref 0–0.9)
MONOCYTES NFR BLD AUTO: 5 % — SIGNIFICANT CHANGE UP (ref 2–14)
NEUTROPHILS # BLD AUTO: 11.92 K/UL — HIGH (ref 1.8–7.4)
NEUTROPHILS NFR BLD AUTO: 80.9 % — HIGH (ref 43–77)
NRBC # BLD: 0 /100 WBCS — SIGNIFICANT CHANGE UP
NRBC # FLD: 0 K/UL — SIGNIFICANT CHANGE UP
PLATELET # BLD AUTO: 164 K/UL — SIGNIFICANT CHANGE UP (ref 150–400)
RBC # BLD: 3.04 M/UL — LOW (ref 3.8–5.2)
RBC # FLD: 15.4 % — HIGH (ref 10.3–14.5)
WBC # BLD: 14.73 K/UL — HIGH (ref 3.8–10.5)
WBC # FLD AUTO: 14.73 K/UL — HIGH (ref 3.8–10.5)

## 2021-11-25 RX ORDER — LEVOTHYROXINE SODIUM 125 MCG
175 TABLET ORAL DAILY
Refills: 0 | Status: DISCONTINUED | OUTPATIENT
Start: 2021-11-25 | End: 2021-11-26

## 2021-11-25 RX ADMIN — Medication 600 MILLIGRAM(S): at 05:55

## 2021-11-25 RX ADMIN — Medication 975 MILLIGRAM(S): at 12:59

## 2021-11-25 RX ADMIN — Medication 975 MILLIGRAM(S): at 13:45

## 2021-11-25 RX ADMIN — MAGNESIUM HYDROXIDE 30 MILLILITER(S): 400 TABLET, CHEWABLE ORAL at 05:54

## 2021-11-25 RX ADMIN — HEPARIN SODIUM 5000 UNIT(S): 5000 INJECTION INTRAVENOUS; SUBCUTANEOUS at 05:55

## 2021-11-25 RX ADMIN — Medication 175 MICROGRAM(S): at 06:37

## 2021-11-25 RX ADMIN — Medication 975 MILLIGRAM(S): at 03:00

## 2021-11-25 RX ADMIN — Medication 600 MILLIGRAM(S): at 20:59

## 2021-11-25 RX ADMIN — Medication 975 MILLIGRAM(S): at 02:22

## 2021-11-25 RX ADMIN — Medication 10 MILLILITER(S): at 12:59

## 2021-11-25 RX ADMIN — SIMETHICONE 80 MILLIGRAM(S): 80 TABLET, CHEWABLE ORAL at 05:55

## 2021-11-25 RX ADMIN — Medication 600 MILLIGRAM(S): at 20:20

## 2021-11-25 RX ADMIN — Medication 600 MILLIGRAM(S): at 00:00

## 2021-11-25 RX ADMIN — HEPARIN SODIUM 5000 UNIT(S): 5000 INJECTION INTRAVENOUS; SUBCUTANEOUS at 18:04

## 2021-11-25 NOTE — PROGRESS NOTE ADULT - PROBLEM SELECTOR PLAN 1
Reason for Disposition  • [1] COVID-19 infection suspected by caller or triager AND [2] mild symptoms (cough, fever, or others) AND [3] no complications or SOB    Additional Information  • Negative: [1] Fever AND [2] > 105 F (40.6 C) by any route OR axillary > 104 F (40 C)     unk    Protocols used: CORONAVIRUS (COVID-19) DIAGNOSED OR [id number]-C-TP     Onset: a week  Location/description: Patient has had a runny nose and congestion for a week. Cough is productive and sore throat present. No difficulty breathing.  Associated Symptoms: no  What improves/worsens symptoms: no  Symptom specific medications: Tylenol-none today  Intake and Output: poor appetite, drinking well. Output normal  Activity level: normal  Temperature (route and time): unk  Weight:   Wt Readings from Last 1 Encounters:   04/28/21 12.6 kg (89 %, Z= 1.25)*     * Growth percentiles are based on WHO (Boys, 0-2 years) data.        Recent visits (last 3-4 weeks) for same reason or recent surgery:  no  Did the patient have a positive coronavirus screening?: Yes, if it is necessary to send patient to a healthcare facility (L&D, ER, Urgent Care, etc.), call and notify facility. DO NOT schedule any face-to-face appointments (clinic visits, lab, etc.)        PLAN:  See/Call Provider within 24 hours    Caller agrees to follow recommendations.   Regarding: wi coughing, congestion, runny nose 2 of 3  ----- Message from Lydia Rolon sent at 8/14/2021  3:55 PM CDT -----  Patient Name: Michel Rivers    Full Name of Provider seen for current symptoms:none    Pregnant (If Yes, how long?):na    Symptoms: coughing, congestion, running nose, fever    Do you or any of your household members have the following symptoms:  Fever >100.0#F or >38.0#C: Yes    New or worsening cough, shortness of breath, sore throat, congestion, or runny nose: Yes    New onset of nausea, vomiting or diarrhea: No    New onset of loss of taste or smell, chills, repeated shaking with chills, muscle pain, or headache: No    Have you, a household member, or another person you have been in contact with tested positive for COVID-19 in the last 14 days?: No    Call Back #: [phone]     Call Center Account # for provider seen for current symptoms: [id number]    Which State are you currently located in? (enter State name in Summary field): [place]    Please update the Demographics section with the patients permanent resident address     Emergent COVID-19 Symptoms requiring Nurse Triage:  Trouble breathing, Persistent pain or pressure in the chest, New confusion, Inability to wake or stay awake, Bluish lips or face       Was evaluated in ER.    Of note, I haven't seen him since 9 months of age.    Thanks  MB   #PP care  - Continue regular diet.  - Increase ambulation.  - Continue motrin, tylenol, oxycodone PRN for pain control.    - AM CBC appropriate for QBL of 1058.   - Fe and Vit C ordered    Paris Penn, PGY1 16

## 2021-11-25 NOTE — PROGRESS NOTE ADULT - SUBJECTIVE AND OBJECTIVE BOX
OB Progress Note:  Delivery, POD#1    S: 35yo POD#1 s/p LTCS . Her pain is well controlled. She is tolerating a regular diet and passing flatus. Denies N/V. Denies CP/SOB/lightheadedness/dizziness. She is ambulating without difficulty. Voiding spontaneously.     O:   Vital Signs Last 24 Hrs  T(C): 36.6 (2021 06:54), Max: 37.2 (2021 08:58)  T(F): 97.9 (2021 06:54), Max: 99 (2021 08:58)  HR: 98 (2021 06:54) (70 - 137)  BP: 115/70 (2021 06:54) (112/68 - 143/75)  BP(mean): 77 (2021 12:00) (64 - 114)  RR: 18 (2021 06:54) (13 - 25)  SpO2: 100% (2021 06:54) (89% - 100%)    Labs:  Blood type: AB Positive  Rubella IgG: RPR: Negative                          9.6<L>   14.73<H> >-----------< 164    (  @ 06:58 )             29.0<L>                        13.1   7.15 >-----------< 190    (  @ 03:10 )             37.9                  PE:  General: NAD  Abdomen: Mildly distended, appropriately tender, incision c/d/i. Uterine fundus firm and palpated at midline  Extremities: No erythema, no pitting edema, no calf tenderness bilaterally       OB Progress Note:  Delivery, POD#1    S: 37yo POD#1 s/p pLTCS . Her pain is well controlled. She is tolerating a regular diet and passing flatus. Denies N/V. Denies CP/SOB/lightheadedness/dizziness. She is ambulating without difficulty. Voiding spontaneously.     O:   Vital Signs Last 24 Hrs  T(C): 36.6 (2021 06:54), Max: 37.2 (2021 08:58)  T(F): 97.9 (2021 06:54), Max: 99 (2021 08:58)  HR: 98 (2021 06:54) (70 - 137)  BP: 115/70 (2021 06:54) (112/68 - 143/75)  BP(mean): 77 (2021 12:00) (64 - 114)  RR: 18 (2021 06:54) (13 - 25)  SpO2: 100% (2021 06:54) (89% - 100%)    Labs:  Blood type: AB Positive  Rubella IgG: RPR: Negative                          9.6<L>   14.73<H> >-----------< 164    (  @ 06:58 )             29.0<L>                        13.1   7.15 >-----------< 190    (  @ 03:10 )             37.9                  PE:  General: NAD  Abdomen: Mildly distended, appropriately tender, incision c/d/i. Uterine fundus firm and palpated at midline  Extremities: No erythema, no pitting edema, no calf tenderness bilaterally       OB Progress Note:  Delivery, POD#1    S: 37yo POD#1 s/p pLTCS . Her pain is well controlled. She is tolerating a regular diet and passing flatus. Denies N/V. Denies CP/SOB/lightheadedness/dizziness. She is ambulating without difficulty. Voiding spontaneously.     O:   Vital Signs Last 24 Hrs  T(C): 36.6 (2021 06:54), Max: 37.2 (2021 08:58)  T(F): 97.9 (2021 06:54), Max: 99 (2021 08:58)  HR: 98 (2021 06:54) (70 - 137)  BP: 115/70 (2021 06:54) (112/68 - 143/75)  BP(mean): 77 (2021 12:00) (64 - 114)  RR: 18 (2021 06:54) (13 - 25)  SpO2: 100% (2021 06:54) (89% - 100%)    Labs:  Blood type: AB Positive  Rubella IgG: RPR: Negative                          9.6<L>   14.73<H> >-----------< 164    (  @ 06:58 )             29.0<L>                        13.1   7.15 >-----------< 190    (  @ 03:10 )             37.9          PE:  General: NAD  Abdomen: Mildly distended, appropriately tender, incision c/d/i. Uterine fundus firm and palpated at midline  Extremities: No erythema, no pitting edema, no calf tenderness bilaterally

## 2021-11-25 NOTE — PROGRESS NOTE ADULT - ASSESSMENT
A/P: 37yo POD#1 s/p pLTCS QBL 1058.  Patient is stable and doing well post-operatively.      #PP care  - Continue regular diet.  - Increase ambulation.  - Continue motrin, tylenol, oxycodone PRN for pain control.    - AM CBC appropriate for QBL of 1058.   - Fe and Vit C ordered    Paris Penn, PGY1  A/P: 35yo POD#1 s/p pLTCS QBL 1058.  Patient is stable and doing well post-operatively.

## 2021-11-25 NOTE — PROGRESS NOTE ADULT - SUBJECTIVE AND OBJECTIVE BOX
Postop Day  1  s/p   C- Section    THERAPY:  [ X] Spinal morphine         Sedation Score:	  [ X] Alert	    [  ] Drowsy        [  ] Arousable	[  ] Asleep	[  ] Unresponsive    Side Effects:	  [ X] None	     [  ] Nausea        [  ] Pruritus        [  ] Weakness   [  ] Numbness      No headache. Able to ambulate and void. Tolerates diet.    ASSESSMENT/ PLAN   Change to po prn pain meds 24 hours post Spinal Morphine.  [ x ]Documentation and Verification of current medications

## 2021-11-26 VITALS
SYSTOLIC BLOOD PRESSURE: 131 MMHG | DIASTOLIC BLOOD PRESSURE: 70 MMHG | HEART RATE: 100 BPM | OXYGEN SATURATION: 99 % | TEMPERATURE: 98 F | RESPIRATION RATE: 18 BRPM

## 2021-11-26 RX ORDER — OXYCODONE HYDROCHLORIDE 5 MG/1
1 TABLET ORAL
Qty: 10 | Refills: 0
Start: 2021-11-26

## 2021-11-26 RX ORDER — IBUPROFEN 200 MG
1 TABLET ORAL
Qty: 0 | Refills: 0 | DISCHARGE
Start: 2021-11-26

## 2021-11-26 RX ORDER — LEVOTHYROXINE SODIUM 125 MCG
1 TABLET ORAL
Qty: 0 | Refills: 0 | DISCHARGE

## 2021-11-26 RX ORDER — PREGABALIN 225 MG/1
0 CAPSULE ORAL
Qty: 0 | Refills: 0 | DISCHARGE

## 2021-11-26 RX ADMIN — Medication 600 MILLIGRAM(S): at 03:50

## 2021-11-26 RX ADMIN — HEPARIN SODIUM 5000 UNIT(S): 5000 INJECTION INTRAVENOUS; SUBCUTANEOUS at 06:19

## 2021-11-26 RX ADMIN — Medication 600 MILLIGRAM(S): at 09:15

## 2021-11-26 RX ADMIN — Medication 975 MILLIGRAM(S): at 06:49

## 2021-11-26 RX ADMIN — MAGNESIUM HYDROXIDE 30 MILLILITER(S): 400 TABLET, CHEWABLE ORAL at 06:40

## 2021-11-26 RX ADMIN — Medication 600 MILLIGRAM(S): at 03:17

## 2021-11-26 RX ADMIN — Medication 175 MICROGRAM(S): at 06:23

## 2021-11-26 RX ADMIN — Medication 975 MILLIGRAM(S): at 13:18

## 2021-11-26 RX ADMIN — Medication 975 MILLIGRAM(S): at 06:19

## 2021-11-26 RX ADMIN — Medication 600 MILLIGRAM(S): at 13:52

## 2021-11-26 RX ADMIN — Medication 10 MILLILITER(S): at 09:37

## 2021-11-26 RX ADMIN — Medication 975 MILLIGRAM(S): at 12:48

## 2021-11-26 NOTE — PROGRESS NOTE ADULT - ATTENDING COMMENTS
Pt seen and examined and agree with above  Pt requesting d/c home today
OB Attending    Patient seen and examined at bedside. Agree with above resident note  Abdominal exam benign  Incision c/d/i with steri strips  -continue routine inpatient care    N Sample-MD Julio Cesar

## 2021-11-26 NOTE — PROGRESS NOTE ADULT - SUBJECTIVE AND OBJECTIVE BOX
OB Progress Note: pTLCS, POD#2    S: 37yo now  POD#2 s/p pLTCS for abnormal fetal status  QBL 1058. Pt seen and examine at bedside. No acute events overnight. Pain is well controlled. She is tolerating a regular diet and passing flatus. She is voiding spontaneously, and ambulating without difficulty. Denies CP/SOB. Denies lightheadedness/dizziness. Denies N/V.    O:  Vitals:  Vital Signs Last 24 Hrs  T(C): 36.8 (2021 05:19), Max: 37.1 (2021 13:59)  T(F): 98.2 (2021 05:19), Max: 98.8 (2021 21:44)  HR: 79 (2021 05:19) (79 - 98)  BP: 126/82 (2021 05:19) (115/70 - 130/80)  BP(mean): --  RR: 18 (2021 05:19) (18 - 18)  SpO2: 100% (2021 05:19) (99% - 100%)    MEDICATIONS  (STANDING):  acetaminophen     Tablet .. 975 milliGRAM(s) Oral <User Schedule>  diphtheria/tetanus/pertussis (acellular) Vaccine (ADAcel) 0.5 milliLiter(s) IntraMuscular once  heparin   Injectable 5000 Unit(s) SubCutaneous every 12 hours  ibuprofen  Tablet. 600 milliGRAM(s) Oral every 6 hours  influenza   Vaccine 0.5 milliLiter(s) IntraMuscular once  levothyroxine 175 MICROGram(s) Oral daily      MEDICATIONS  (PRN):  diphenhydrAMINE 25 milliGRAM(s) Oral every 6 hours PRN Pruritus  guaifenesin/dextromethorphan Oral Liquid 10 milliLiter(s) Oral every 4 hours PRN Cough  lanolin Ointment 1 Application(s) Topical every 6 hours PRN Sore Nipples  magnesium hydroxide Suspension 30 milliLiter(s) Oral two times a day PRN Constipation  oxyCODONE    IR 5 milliGRAM(s) Oral every 3 hours PRN Moderate to Severe Pain (4-10)  oxyCODONE    IR 5 milliGRAM(s) Oral once PRN Moderate to Severe Pain (4-10)  simethicone 80 milliGRAM(s) Chew every 4 hours PRN Gas      Labs:  Blood type: AB Positive  Rubella IgG: RPR: Negative                          9.6<L>   14.73<H> >-----------< 164    ( 11- @ 06:58 )             29.0<L>              PE:  General: NAD  Lungs: CTA b/l good airway entry  CVS: RRR S1S2  Abdomen: Soft, appropriately tender, incision c/d/i.  Extremities: No erythema, no pitting edema, pedal pulse 2+ bilateral    A/P: 37yo now  POD#2 s/p pLTCS.  Patient is stable and doing well post-operatively.    - Continue Routine Postop/ PP care  - Continue regular diet.  - Increase ambulation.  - Continue Motrin, Tylenol, oxycodone PRN for pain control.  - Reviewed PEC precaution in detail pt verbalized understanding  - Discharge planning today  - F/u in MD office 1-2 wks          LAURA Silva-BC OB Progress Note: pTLCS, POD#2    S: 37yo now  POD#2 s/p pLTCS for abnormal fetal status QBL 1058. Pt seen and examine at bedside. No acute events overnight. Pain is well controlled. She is tolerating a regular diet and passing flatus. She is voiding spontaneously, and ambulating without difficulty. Denies CP/SOB. Denies lightheadedness/dizziness. Denies N/V.    O:  Vitals:  Vital Signs Last 24 Hrs  T(C): 36.8 (2021 05:19), Max: 37.1 (2021 13:59)  T(F): 98.2 (2021 05:19), Max: 98.8 (2021 21:44)  HR: 79 (2021 05:19) (79 - 98)  BP: 126/82 (2021 05:19) (115/70 - 130/80)  BP(mean): --  RR: 18 (2021 05:19) (18 - 18)  SpO2: 100% (2021 05:19) (99% - 100%)    MEDICATIONS  (STANDING):  acetaminophen     Tablet .. 975 milliGRAM(s) Oral <User Schedule>  diphtheria/tetanus/pertussis (acellular) Vaccine (ADAcel) 0.5 milliLiter(s) IntraMuscular once  heparin   Injectable 5000 Unit(s) SubCutaneous every 12 hours  ibuprofen  Tablet. 600 milliGRAM(s) Oral every 6 hours  influenza   Vaccine 0.5 milliLiter(s) IntraMuscular once  levothyroxine 175 MICROGram(s) Oral daily      MEDICATIONS  (PRN):  diphenhydrAMINE 25 milliGRAM(s) Oral every 6 hours PRN Pruritus  guaifenesin/dextromethorphan Oral Liquid 10 milliLiter(s) Oral every 4 hours PRN Cough  lanolin Ointment 1 Application(s) Topical every 6 hours PRN Sore Nipples  magnesium hydroxide Suspension 30 milliLiter(s) Oral two times a day PRN Constipation  oxyCODONE    IR 5 milliGRAM(s) Oral every 3 hours PRN Moderate to Severe Pain (4-10)  oxyCODONE    IR 5 milliGRAM(s) Oral once PRN Moderate to Severe Pain (4-10)  simethicone 80 milliGRAM(s) Chew every 4 hours PRN Gas      Labs:  Blood type: AB Positive  Rubella IgG: RPR: Negative                          9.6<L>   14.73<H> >-----------< 164    ( 11- @ 06:58 )             29.0<L>          PE:  General: NAD  Lungs: CTA b/l good airway entry  CVS: RRR S1S2  Abdomen: Soft, appropriately tender, incision c/d/i ster-strips  Extremities: No erythema, no pitting edema, pedal pulse 2+ bilateral    A/P: 37yo now  POD#2 s/p pLTCS.  Patient is stable and doing well post-operatively.    - Continue Routine Postop/ PP care  - Continue regular diet.  - Increase ambulation.  - Continue Motrin, Tylenol, oxycodone PRN for pain control.  - Reviewed PEC precaution in detail pt verbalized understanding  - Discharge planning today  - F/u in MD office 1-2 wks      LAURA Silva-BC

## 2021-12-09 ENCOUNTER — APPOINTMENT (OUTPATIENT)
Dept: OBGYN | Facility: CLINIC | Age: 36
End: 2021-12-09
Payer: MEDICAID

## 2021-12-09 VITALS
SYSTOLIC BLOOD PRESSURE: 92 MMHG | BODY MASS INDEX: 25.3 KG/M2 | DIASTOLIC BLOOD PRESSURE: 65 MMHG | HEIGHT: 61 IN | WEIGHT: 134 LBS

## 2021-12-09 DIAGNOSIS — R30.0 DYSURIA: ICD-10-CM

## 2021-12-09 PROCEDURE — 0503F POSTPARTUM CARE VISIT: CPT

## 2021-12-14 LAB
BACTERIA UR CULT: NORMAL
TSH SERPL-ACNC: 0.87 UIU/ML

## 2021-12-14 RX ORDER — LEVOTHYROXINE SODIUM 0.15 MG/1
150 TABLET ORAL DAILY
Qty: 90 | Refills: 0 | Status: ACTIVE | COMMUNITY
Start: 2021-12-14 | End: 1900-01-01

## 2021-12-14 NOTE — HISTORY OF PRESENT ILLNESS
[Postpartum Follow Up] : postpartum follow up [None] : The patient is currently asymptomatic [Clean/Dry/Intact] : clean, dry and intact [Doing Well] : is doing well [Complications:___] : no complications [Dysuria] : dysuria [de-identified] : Primary C/S for NRFHT 11/24, liveborn male  [de-identified] : Bottle feeding [de-identified] : 35 y/o P3 s/p primary C/S 11/24 presenting for postpartum visit \par -incision healing well                                                                                                                                                           -dysuria: f/u [facility]AC\par -TFTs drawn today\par -f/u for 4 weeks for 6 week PP visit

## 2022-01-07 ENCOUNTER — APPOINTMENT (OUTPATIENT)
Dept: OBGYN | Facility: CLINIC | Age: 37
End: 2022-01-07
Payer: MEDICAID

## 2022-01-07 VITALS — DIASTOLIC BLOOD PRESSURE: 67 MMHG | WEIGHT: 136 LBS | BODY MASS INDEX: 25.7 KG/M2 | SYSTOLIC BLOOD PRESSURE: 101 MMHG

## 2022-01-07 DIAGNOSIS — K64.9 UNSPECIFIED HEMORRHOIDS: ICD-10-CM

## 2022-01-07 PROCEDURE — 36415 COLL VENOUS BLD VENIPUNCTURE: CPT

## 2022-01-07 PROCEDURE — 0503F POSTPARTUM CARE VISIT: CPT

## 2022-01-07 RX ORDER — HYDROCORTISONE 25 MG/G
2.5 CREAM TOPICAL
Qty: 1 | Refills: 0 | Status: ACTIVE | COMMUNITY
Start: 2022-01-07 | End: 1900-01-01

## 2022-01-07 NOTE — HISTORY OF PRESENT ILLNESS
[Postpartum Follow Up] : postpartum follow up [Complications:___] : no complications [Delivery Date: ___] : on [unfilled] [Primary C/S] : delivered by  section [Male] : Delivery History: baby boy [Clean/Dry/Intact] : clean, dry and intact [Healed] : healed [Back to Normal] : is back to normal in size [None] : no vaginal bleeding [Normal] : the vagina was normal [Examination Of The Breasts] : breasts are normal [Doing Well] : is doing well [de-identified] : Bottle Feeding [FreeTextEntry1] : 35 y/o F s/p C/S presenting for postpartum visit \par -incision healing well\par -Patient cleared to resume normal activity (sexual intercourse and exercise)\par -Patient counseled on contraception options, desires possible Paragard IUD\par -decreased synthroid to 150mcg 4 weeks ago, TFTs drawn today\par -Rx for anusol sent to pharmacy for hemorrhoid\par -f/u for 3 months for annual

## 2022-01-11 ENCOUNTER — TRANSCRIPTION ENCOUNTER (OUTPATIENT)
Age: 37
End: 2022-01-11

## 2022-01-11 LAB
T4 FREE SERPL-MCNC: 1.9 NG/DL
TSH SERPL-ACNC: 0.21 UIU/ML

## 2022-01-11 RX ORDER — LEVOTHYROXINE SODIUM 125 UG/1
125 CAPSULE ORAL DAILY
Qty: 90 | Refills: 0 | Status: ACTIVE | COMMUNITY
Start: 2022-01-11 | End: 1900-01-01

## 2022-01-27 ENCOUNTER — APPOINTMENT (OUTPATIENT)
Dept: OBGYN | Facility: CLINIC | Age: 37
End: 2022-01-27

## 2022-04-07 NOTE — OB RN TRIAGE NOTE - GRAVIDA, OB PROFILE
3 Sarecycline Counseling: Patient advised regarding possible photosensitivity and discoloration of the teeth, skin, lips, tongue and gums.  Patient instructed to avoid sunlight, if possible.  When exposed to sunlight, patients should wear protective clothing, sunglasses, and sunscreen.  The patient was instructed to call the office immediately if the following severe adverse effects occur:  hearing changes, easy bruising/bleeding, severe headache, or vision changes.  The patient verbalized understanding of the proper use and possible adverse effects of sarecycline.  All of the patient's questions and concerns were addressed.

## 2024-12-11 NOTE — DISCUSSION/SUMMARY
[FreeTextEntry1] : 36 y/o P2 presenting to transfer care at 29 weeks GA\par -PNL reviewed, missing labs ordered and drawn today\par -NIPT done today due to lack of prior screen per patient and nothing in records\par -f/u TFTs done today - currently on synthroid 175mcg QAM\par -f/u 2 weeks for routine visit 3